# Patient Record
Sex: MALE | Race: WHITE | HISPANIC OR LATINO | URBAN - METROPOLITAN AREA
[De-identification: names, ages, dates, MRNs, and addresses within clinical notes are randomized per-mention and may not be internally consistent; named-entity substitution may affect disease eponyms.]

---

## 2021-03-12 ENCOUNTER — OFFICE (OUTPATIENT)
Dept: URBAN - METROPOLITAN AREA CLINIC 102 | Facility: CLINIC | Age: 51
End: 2021-03-12

## 2021-03-12 VITALS
TEMPERATURE: 97 F | WEIGHT: 186 LBS | DIASTOLIC BLOOD PRESSURE: 82 MMHG | SYSTOLIC BLOOD PRESSURE: 132 MMHG | HEIGHT: 67 IN | HEART RATE: 84 BPM

## 2021-03-12 DIAGNOSIS — K21.9 GASTRO-ESOPHAGEAL REFLUX DISEASE WITHOUT ESOPHAGITIS: ICD-10-CM

## 2021-03-12 DIAGNOSIS — Z86.010 PERSONAL HISTORY OF COLONIC POLYPS: ICD-10-CM

## 2021-03-12 DIAGNOSIS — R10.13 EPIGASTRIC PAIN: ICD-10-CM

## 2021-03-12 PROCEDURE — 99204 OFFICE O/P NEW MOD 45 MIN: CPT | Performed by: PHYSICIAN ASSISTANT

## 2021-03-12 NOTE — SERVICEHPINOTES
ENEDINA BENAVIDES   is a   51   year old    female who is being seen in consultation at the request of   CARLEE KEARNEY   for acid reflux. He has GERD and pain in the epigastrium. He has a hx of GERD. Was on Nexium for awhile but resumed it as symptoms of GERD returned. The Nexium helps but not fully (still wakes up in the night d/t symptoms). He was given Dexilant 30 mg which he believes caused LUQ pain. No dysphagia, nausea, vomiting, anorexia, weight loss, or early satiety. He feels a globus sensation and epigastric discomfort which is brought on by stress. EGD 12/15 showed gastritis and a normal esophagus bx unavailable today. Patient had a 5 mm polyp removed from the cecum, bx also unavailable for this. Barium swallow in 2015 showed moderate reflux. CT scan in 2015 was unremarkable. No regular NSAID use. No constipation and occasional diarrhea. No rectal bleeding or blood per rectum. No family hx of CRC.